# Patient Record
Sex: MALE | Race: BLACK OR AFRICAN AMERICAN
[De-identification: names, ages, dates, MRNs, and addresses within clinical notes are randomized per-mention and may not be internally consistent; named-entity substitution may affect disease eponyms.]

---

## 2022-05-12 ENCOUNTER — HOSPITAL ENCOUNTER (EMERGENCY)
Dept: HOSPITAL 61 - ER | Age: 62
Discharge: HOME | End: 2022-05-12
Payer: MEDICAID

## 2022-05-12 VITALS — SYSTOLIC BLOOD PRESSURE: 113 MMHG | DIASTOLIC BLOOD PRESSURE: 75 MMHG

## 2022-05-12 VITALS — WEIGHT: 187.39 LBS | BODY MASS INDEX: 26.23 KG/M2 | HEIGHT: 71 IN

## 2022-05-12 DIAGNOSIS — M54.6: ICD-10-CM

## 2022-05-12 DIAGNOSIS — R51.9: ICD-10-CM

## 2022-05-12 DIAGNOSIS — M54.2: ICD-10-CM

## 2022-05-12 DIAGNOSIS — M54.42: Primary | ICD-10-CM

## 2022-05-12 LAB
ALBUMIN SERPL-MCNC: 4 G/DL (ref 3.4–5)
ALBUMIN/GLOB SERPL: 0.8 {RATIO} (ref 1–1.7)
ALP SERPL-CCNC: 78 U/L (ref 46–116)
ALT SERPL-CCNC: 33 U/L (ref 16–63)
AMPHETAMINE/METHAMPHETAMINE: (no result)
ANION GAP SERPL CALC-SCNC: 13 MMOL/L (ref 6–14)
ANISOCYTOSIS BLD QL SMEAR: SLIGHT
APTT PPP: YELLOW S
AST SERPL-CCNC: 44 U/L (ref 15–37)
BACTERIA #/AREA URNS HPF: (no result) /HPF
BARBITURATES UR-MCNC: (no result) UG/ML
BASOPHILS # BLD AUTO: 0 X10^3/UL (ref 0–0.2)
BASOPHILS NFR BLD: 1 % (ref 0–3)
BENZODIAZ UR-MCNC: (no result) UG/L
BILIRUB SERPL-MCNC: 0.5 MG/DL (ref 0.2–1)
BILIRUB UR QL STRIP: NEGATIVE
BUN SERPL-MCNC: 15 MG/DL (ref 8–26)
BUN/CREAT SERPL: 12 (ref 6–20)
CALCIUM SERPL-MCNC: 9.2 MG/DL (ref 8.5–10.1)
CANNABINOIDS UR-MCNC: (no result) UG/L
CHLORIDE SERPL-SCNC: 104 MMOL/L (ref 98–107)
CO2 SERPL-SCNC: 23 MMOL/L (ref 21–32)
COCAINE UR-MCNC: (no result) NG/ML
CREAT SERPL-MCNC: 1.3 MG/DL (ref 0.7–1.3)
EOSINOPHIL NFR BLD: 0.1 X10^3/UL (ref 0–0.7)
EOSINOPHIL NFR BLD: 2 % (ref 0–3)
ERYTHROCYTE [DISTWIDTH] IN BLOOD BY AUTOMATED COUNT: 23.5 % (ref 11.5–14.5)
FIBRINOGEN PPP-MCNC: CLEAR MG/DL
GFR SERPLBLD BASED ON 1.73 SQ M-ARVRAT: 67.7 ML/MIN
GLUCOSE SERPL-MCNC: 120 MG/DL (ref 70–99)
HCT VFR BLD CALC: 27.1 % (ref 39–53)
HGB BLD-MCNC: 8.2 G/DL (ref 13–17.5)
LYMPHOCYTES # BLD: 2.2 X10^3/UL (ref 1–4.8)
LYMPHOCYTES NFR BLD AUTO: 30 % (ref 24–48)
MCH RBC QN AUTO: 24 PG (ref 25–35)
MCHC RBC AUTO-ENTMCNC: 30 G/DL (ref 31–37)
MCV RBC AUTO: 79 FL (ref 79–100)
METHADONE SERPL-MCNC: (no result) NG/ML
MONO #: 0.5 X10^3/UL (ref 0–1.1)
MONOCYTES NFR BLD: 7 % (ref 0–9)
NEUT #: 4.3 X10^3/UL (ref 1.8–7.7)
NEUTROPHILS NFR BLD AUTO: 60 % (ref 31–73)
NITRITE UR QL STRIP: NEGATIVE
OPIATES UR-MCNC: (no result) NG/ML
PCP SERPL-MCNC: (no result) MG/DL
PH UR STRIP: 6 [PH]
PLATELET # BLD AUTO: 98 X10^3/UL (ref 140–400)
PLATELET # BLD EST: (no result) 10*3/UL
POTASSIUM SERPL-SCNC: 3.8 MMOL/L (ref 3.5–5.1)
PROT SERPL-MCNC: 8.8 G/DL (ref 6.4–8.2)
PROT UR STRIP-MCNC: 100 MG/DL
RBC # BLD AUTO: 3.42 X10^6/UL (ref 4.3–5.7)
RBC #/AREA URNS HPF: 0 /HPF (ref 0–2)
SODIUM SERPL-SCNC: 140 MMOL/L (ref 136–145)
UROBILINOGEN UR-MCNC: 0.2 MG/DL
WBC # BLD AUTO: 7.2 X10^3/UL (ref 4–11)
WBC #/AREA URNS HPF: (no result) /HPF (ref 0–4)

## 2022-05-12 PROCEDURE — 99285 EMERGENCY DEPT VISIT HI MDM: CPT

## 2022-05-12 PROCEDURE — 85025 COMPLETE CBC W/AUTO DIFF WBC: CPT

## 2022-05-12 PROCEDURE — 84484 ASSAY OF TROPONIN QUANT: CPT

## 2022-05-12 PROCEDURE — 36415 COLL VENOUS BLD VENIPUNCTURE: CPT

## 2022-05-12 PROCEDURE — 80053 COMPREHEN METABOLIC PANEL: CPT

## 2022-05-12 PROCEDURE — 70450 CT HEAD/BRAIN W/O DYE: CPT

## 2022-05-12 PROCEDURE — 72131 CT LUMBAR SPINE W/O DYE: CPT

## 2022-05-12 PROCEDURE — 72125 CT NECK SPINE W/O DYE: CPT

## 2022-05-12 PROCEDURE — 81001 URINALYSIS AUTO W/SCOPE: CPT

## 2022-05-12 PROCEDURE — 80307 DRUG TEST PRSMV CHEM ANLYZR: CPT

## 2022-05-12 PROCEDURE — 93005 ELECTROCARDIOGRAM TRACING: CPT

## 2022-05-12 PROCEDURE — 72128 CT CHEST SPINE W/O DYE: CPT

## 2022-05-12 PROCEDURE — 87086 URINE CULTURE/COLONY COUNT: CPT

## 2022-05-12 NOTE — EKG
[address] [place] [address]

Test Date:    2022               Test Time:    10:50:15

Pat Name:     FREDA THOMSON          Department:   

Patient ID:   [id number]           Room:          

Gender:       M                        Technician:   

:          1960               Requested By: ARIE JOHNSON

Order Number: [id number]           Reading MD:   Nba Alvarenga

                                 Measurements

Intervals                              Axis          

Rate:         67                       P:            40

IL:           136                      QRS:          34

QRSD:         80                       T:            42

QT:           382                                    

QTc:          406                                    

                           Interpretive Statements

SINUS RHYTHM

Electronically Signed On 2022 14:47:03 CDT by Nba Alvarenga

## 2022-05-12 NOTE — RAD
PQRS Compliance Statement:



One or more of the following individualized dose reduction techniques were utilized for this examinat
ion:  

1. Automated exposure control  

2. Adjustment of the mA and/or kV according to patient size  

3. Use of iterative reconstruction technique





CT head and cervical spine without contrast 5/12/2022 9:51 AM



INDICATION: Fall, pain and syncope



COMPARISON: None available



TECHNIQUE: Multiple axial CT images of the head were obtained from skull base through the vertex with
out intravenous contrast. Multiple axial CT images of the cervical, thoracic and lumbar spine were ob
tained without intravenous contrast. Coronal and sagittal reformats are provided.



FINDINGS:



Head:



Ventricles, sulci and basal cisterns are within normal limits. There is no hydrocephalus. Gray-white 
matter differentiation is normal. There is no acute intracranial hemorrhage. There is no mass, mass e
ffect or midline shift. Posterior fossa is normal in appearance.



Visualized portions of the orbits are normal. Paranasal sinuses are well aerated. Mastoid air cells a
re well aerated. Scalp and calvaria are normal.



Cervical spine:



Alignment of the cervical spine is normal. Skull base is intact. Craniocervical junction is normal in
 appearance. Atlantoaxial articulation is normal.



Vertebral body heights are maintained without evidence for acute fracture.



Mild disc height loss at and C6-C7 and C7-T1. Moderate anterior marginal osteophytosis identified at 
C5-C6 and C6-C7 without acute fracture.



At C2-C3, there is a posterior disc osteophyte complex asymmetric to the right. Moderate to severe ri
ght and moderate left facet arthropathy. Moderate right and mild left neuroforaminal stenosis. Mild s
ravin canal stenosis.



At C3-C4, there is a disc bulge asymmetric to the left. Moderate severe facet arthropathy. Mild uncov
ertebral joint disease. Moderate bilateral neuroforaminal stenosis. Mild spinal canal stenosis.



At C4-C5, there is a posterior disc osteophyte complex. Severe right and moderate left facet arthropa
thy. Moderate uncovertebral joint disease. Moderate to severe right and mild left neuroforaminal sten
osis. No spinal canal stenosis.



At C6 5 C6, there is a posterior disc osteophyte complex. Moderate uncovertebral joint disease. Moder
ate severe bilateral facet arthropathy, right greater than left. Moderate neuroforaminal stenosis, le
ft greater than right. Mild spinal canal stenosis.



At C6-C7, there is a posterior disc osteophyte complex. Mild to moderate facet arthropathy. Moderate 
to advanced right and moderate left uncovertebral joint disease. Moderate severe right and moderate l
eft neuroforaminal stenosis. Mild spinal canal stenosis.



There is no prevertebral soft tissue swelling. Thyroid gland is normal in appearance. Visualized port
ions of the lung apices are normal without evidence for suspicious pulmonary nodule or infiltrate.



Thoracic spine:



Alinement of thoracic spine is normal. Vertebral body heights are maintained. Acute fractures identif
ied. Disc heights are maintained. Moderate anterior marginal osteophytosis. Bridging anterior margina
l osteophytosis identified with clefts noted at T6-T7 and T11-T12. No acute fracture. No osseous neur
oforaminal or spinal canal stenosis. Lungs are clear. Mediastinum is normal in appearance. Thoracic a
eduard is normal in caliber. Adrenal glands are normal.



Lumbar spine:



Alignment of the lumbar spine is normal. Vertebral body heights are maintained. Acute fractures ident
ified. Severe anterior marginal osteophytosis identified at L2-L3 and moderate anterior marginal oste
ophytosis at L3-L4 and L4-L5. Disc heights are maintained. There is congenital narrowing of the spina
l canal secondary to shortened pedicles. Abdominal aorta is normal in caliber with moderate calcified
 atheromatous plaque. No suspicious intracranial abnormality is identified. Visualized portions of th
e sacrum appear intact. Sacroiliac joints are well aligned. Transverse processes are intact.



At L2-L3, there is a circumferential disc bulge with moderate facet arthropathy and ligamentum flavum
 infolding resulting in moderate bilateral neuroforaminal stenosis and moderate severe spinal canal s
tenosis.



At L3-L4: There is a circumferential disc bulge with moderate facet arthropathy ligamentum flavum inf
olding. Findings result in moderate bilateral neuroforaminal stenosis and moderate spinal canal steno
sis.



At L4-L5, there is a disc bulge asymmetric to the left with left far lateral disc protrusion. Severe 
left and moderate facet arthropathy ligamentum flavum infolding. Severe left and moderate right neuro
foraminal stenosis. Moderate spinal canal stenosis.



L5-S1: There is a disc bulge asymmetric to the right. Severe right and moderate left facet arthropath
y. Moderate severe bilateral neuroforaminal stenosis. Mild spinal canal stenosis.



IMPRESSION:



1. No acute intracranial hemorrhage.

2. No acute fracture or malalignment of the cervical spine. Mild to moderate cervical spondylosis.

3. No acute fracture or malalignment of the thoracic spine.

4. Mild to moderate lumbar spondylosis with congenital narrowing of the spinal canal secondary to jinny
rtened pedicles. No acute fracture of the lumbar spine.



Electronically signed by: Rafaela Roa MD (5/12/2022 10:35 AM) [id number]

## 2022-05-12 NOTE — PHYS DOC
Past Medical History


Additional Past Medical Histor:  NEUROPATHY


Past Surgical History:  No Surgical History





General Adult


EDM:


Chief Complaint:  BACK PAIN OR INJURY





HPI:


HPI:





Patient is a 62  year old male who presents with here for upper back pain with 

muscle tightness, mid back pain and lower back pain with sharp shooting pains 

that goes down the left leg.  Patient states that he passes out a lot and does 

not know why.  He states is been going on for the last couple of months.  He 

states he has been seen for this before.  He cannot tell me anything about his 

past medical history or what medications he is on.  He states he has home 

health.  Daughter is in the room but also a very poor historian.  She stated 2 

days ago he was walking into her kitchen and fell backward. Daughter stated he 

did not blackout.  Patient and daughter are unable to tell me if he struck his 

head or if he has a seizure condition.  Patient states he has been out of his 

gabapentin and trazodone for the last 2 weeks.  Patient states he goes to [facility] but the ketty he is to see is not there any longer.  He states that he gets

his medications from [facility].  Patient rates his pain at a 10 out of 10 at 

this time.





Review of Systems:


Review of Systems:


Constitutional:   Denies fever or chills. []


Eyes:   Denies change in visual acuity. []


HENT:   Denies nasal congestion or sore throat. [] 


Respiratory:   Denies cough or shortness of breath. [] 


Cardiovascular:   Denies chest pain or edema. [] 


GI:   Denies abdominal pain, nausea, vomiting, bloody stools or diarrhea. [] 


:  Denies dysuria. [] 


Musculoskeletal:   + Cervical, thoracic, +lumbar back pain or denies joint pain.

  +Sharp shooting left leg pain [] 


Integument:   Denies rash. [] 


Neurologic:   Denies headache, focal weakness or sensory changes. + Syncopal 

episodes [] 


Endocrine:   Denies polyuria or polydipsia. [] 


Lymphatic:  Denies swollen glands. [] 


Psychiatric:  Denies depression or anxiety. []





Heart Score:


C/O Chest Pain:  No


Risk Factors:


Risk Factors:  DM, Current or recent (<one month) smoker, HTN, HLP, family 

history of CAD, obesity.


Risk Scores:


Score 0 - 3:  2.5% MACE over next 6 weeks - Discharge Home


Score 4 - 6:  20.3% MACE over next 6 weeks - Admit for Clinical Observation


Score 7 - 10:  72.7% MACE over next 6 weeks - Early Invasive Strategies





Current Medications:





Current Medications








 Medications


  (Trade)  Dose


 Ordered  Sig/Leonardo  Start Time


 Stop Time Status Last Admin


Dose Admin


 


 Dexamethasone


  (Decadron)  10 mg  1X  ONCE  22 09:30


 5 09:31 DC  





 


 Lidocaine


  (Lidoderm)  1 patch  1X  ONCE  22 09:30


 22 09:31 DC  














Allergies:


Allergies:





Allergies








Coded Allergies Type Severity Reaction Last Updated Verified


 


  No Known Drug Allergies    22 No











Physical Exam:


PE:





Constitutional: Well developed, well nourished, no acute distress, non-toxic 

appearance. []


HENT: Normocephalic, atraumatic, bilateral external ears normal, oropharynx 

moist, no oral exudates, nose normal. []


Eyes: PERRLA, EOMI, conjunctiva normal, no discharge. [] 


Neck: Normal range of motion, no tenderness, supple, no stridor. [] 


Cardiovascular:Heart rate regular rhythm, no murmur []


Lungs & Thorax:  Bilateral breath sounds clear to auscultation []


Abdomen: Bowel sounds normal, soft, no tenderness, no masses, no pulsatile 

masses. [] 


Skin: Warm, dry, no erythema, no rash. [] 


Back: Thoracic and lumbar tenderness, no CVA tenderness. [] 


Extremities: No tenderness, no cyanosis, no clubbing, ROM intact, no edema. [] 


Neurologic: Alert and oriented X 3, normal motor function, normal sensory func

tion, no focal deficits noted.  Tremors []


Psychologic: Affect normal, judgement normal, mood normal. []





Current Patient Data:


Vital Signs:





                                   Vital Signs








  Date Time  Temp Pulse Resp B/P (MAP) Pulse Ox O2 Delivery O2 Flow Rate FiO2


 


22 09:07 97.0 89 20 176/100 (125) 100   





 97.0       











EKG:


EK and read by Dr. Brewster is a sinus rhythm without STEMI.





Radiology/Procedures:


Radiology/Procedures:


[]


Impression:


                            [facility]


                    [address]  [place], [place] [id number] [phone]


                                        


                                 IMAGING REPORT





                                     Signed





PATIENT: FREDA THOMSON  ACCOUNT: [id number]     MRN#: [id number]


: 1960           LOCATION: ER              AGE: 62


SEX: M                    EXAM DT: 22         ACCESSION#: [id number]


STATUS: REG ER            ORD. PHYSICIAN: ARIE JOHNSON


REASON: FALL, PAIN, SYNCOPE


PROCEDURE: CT THORACIC SPINE WO CONTRAST





PQRS Compliance Statement:





One or more of the following individualized dose reduction techniques were 

utilized for this examination:  


1. Automated exposure control  


2. Adjustment of the mA and/or kV according to patient size  


3. Use of iterative reconstruction technique








CT head and cervical spine without contrast 2022 9:51 AM





INDICATION: Fall, pain and syncope





COMPARISON: None available





TECHNIQUE: Multiple axial CT images of the head were obtained from skull base 

through the vertex without intravenous contrast. Multiple axial CT images of the

 cervical, thoracic and lumbar spine were obtained without intravenous contrast.

 Coronal and sagittal reformats are provided.





FINDINGS:





Head:





Ventricles, sulci and basal cisterns are within normal limits. There is no 

hydrocephalus. Gray-white matter differentiation is normal. There is no acute 

intracranial hemorrhage. There is no mass, mass effect or midline shift. 

Posterior fossa is normal in appearance.





Visualized portions of the orbits are normal. Paranasal sinuses are well 

aerated. Mastoid air cells are well aerated. Scalp and calvaria are normal.





Cervical spine:





Alignment of the cervical spine is normal. Skull base is intact. Craniocervical 

junction is normal in appearance. Atlantoaxial articulation is normal.





Vertebral body heights are maintained without evidence for acute fracture.





Mild disc height loss at and C6-C7 and C7-T1. Moderate anterior marginal 

osteophytosis identified at C5-C6 and C6-C7 without acute fracture.





At C2-C3, there is a posterior disc osteophyte complex asymmetric to the right. 

Moderate to severe right and moderate left facet arthropathy. Moderate right and

 mild left neuroforaminal stenosis. Mild spinal canal stenosis.





At C3-C4, there is a disc bulge asymmetric to the left. Moderate severe facet 

arthropathy. Mild uncovertebral joint disease. Moderate bilateral neuroforaminal

 stenosis. Mild spinal canal stenosis.





At C4-C5, there is a posterior disc osteophyte complex. Severe right and 

moderate left facet arthropathy. Moderate uncovertebral joint disease. Moderate 

to severe right and mild left neuroforaminal stenosis. No spinal canal stenosis.





At C6 5 C6, there is a posterior disc osteophyte complex. Moderate uncovertebral

 joint disease. Moderate severe bilateral facet arthropathy, right greater than 

left. Moderate neuroforaminal stenosis, left greater than right. Mild spinal 

canal stenosis.





At C6-C7, there is a posterior disc osteophyte complex. Mild to moderate facet 

arthropathy. Moderate to advanced right and moderate left uncovertebral joint 

disease. Moderate severe right and moderate left neuroforaminal stenosis. Mild 

spinal canal stenosis.





There is no prevertebral soft tissue swelling. Thyroid gland is normal in 

appearance. Visualized portions of the lung apices are normal without evidence 

for suspicious pulmonary nodule or infiltrate.





Thoracic spine:





Alinement of thoracic spine is normal. Vertebral body heights are maintained. 

Acute fractures identified. Disc heights are maintained. Moderate anterior 

marginal osteophytosis. Bridging anterior marginal osteophytosis identified with

 clefts noted at T6-T7 and T11-T12. No acute fracture. No osseous neuroforaminal

 or spinal canal stenosis. Lungs are clear. Mediastinum is normal in appearance.

 Thoracic aorta is normal in caliber. Adrenal glands are normal.





Lumbar spine:





Alignment of the lumbar spine is normal. Vertebral body heights are maintained. 

Acute fractures identified. Severe anterior marginal osteophytosis identified at

 L2-L3 and moderate anterior marginal osteophytosis at L3-L4 and L4-L5. Disc 

heights are maintained. There is congenital narrowing of the spinal canal 

secondary to shortened pedicles. Abdominal aorta is normal in caliber with mode

rate calcified atheromatous plaque. No suspicious intracranial abnormality is 

identified. Visualized portions of the sacrum appear intact. Sacroiliac joints 

are well aligned. Transverse processes are intact.





At L2-L3, there is a circumferential disc bulge with moderate facet arthropathy 

and ligamentum flavum infolding resulting in moderate bilateral neuroforaminal 

stenosis and moderate severe spinal canal stenosis.





At L3-L4: There is a circumferential disc bulge with moderate facet arthropathy 

ligamentum flavum infolding. Findings result in moderate bilateral 

neuroforaminal stenosis and moderate spinal canal stenosis.





At L4-L5, there is a disc bulge asymmetric to the left with left far lateral 

disc protrusion. Severe left and moderate facet arthropathy ligamentum flavum 

infolding. Severe left and moderate right neuroforaminal stenosis. Moderate 

spinal canal stenosis.





L5-S1: There is a disc bulge asymmetric to the right. Severe right and moderate 

left facet arthropathy. Moderate severe bilateral neuroforaminal stenosis. Mild 

spinal canal stenosis.





IMPRESSION:





1. No acute intracranial hemorrhage.


2. No acute fracture or malalignment of the cervical spine. Mild to moderate 

cervical spondylosis.


3. No acute fracture or malalignment of the thoracic spine.


4. Mild to moderate lumbar spondylosis with congenital narrowing of the spinal 

canal secondary to shortened pedicles. No acute fracture of the lumbar spine.





Electronically signed by: Dragan Castañeda MD (2022 10:35 AM) [id number]














DICTATED and SIGNED BY:     DRAGAN CASTAÑEDA MD


DATE:     22 1022





Course & Med Decision Making:


Course & Med Decision Making


Pertinent Labs and Imaging studies reviewed. (See chart for details)





See HPI.  I had the nurse RN Oscar called [facility] pharmacy and he spoke to

 the pharmacy of which they state that patient is on amlodipine of which she 

picked up in January, Zanaflex of which she picked up last on  and 

ibuprofen 600 mg.  They state that they have no prescriptions ever given for 

gabapentin or trazodone for the patient.  He has tremors just sitting still and 

with movement.  States he has had those chronically.  He states he does have 

neuropathy but cannot tell me why.  Full range of motion of his legs and all of 

his extremities with equal strength.  Denies any dizziness, chest pain, 

shortness of breath, new focal weakness or numbness and tingling, recent 

illness, vision change, headache, abdominal pain, nausea, vomiting, diarrhea.  

Vital signs are within normal limits although he is hypertensive.  Speaks in 

full clear sentences.  Skin pink warm and dry.  Alert and oriented x4.  No 

extremity edema.  Lungs are clear are clear to auscultation in all lobes.





Patient is positive for PCP.  CT shows no acute findings.  I gave patient a dose

 of his Norvasc 5 mg because he is hypertensive and he states he has not taken 

any for 2 weeks.  Patient will not be getting any gabapentin or narcotics here 

today due to his PCP drug use.  He needs to follow-up with his primary care 

provider.  He is given a lidocaine patch and a Zanaflex in the ED. Troponin is 

negative.  EKG shows no acute findings and is a sinus rhythm without STEMI. I 

went over history, results, and care plan with Dr Brewster and he states patient is 

ok to go home.





Dragon Disclaimer:


Dragon Disclaimer:


This electronic medical record was generated, in whole or in part, using a voice

 recognition dictation system.





Departure


Departure


Impression:  


   Primary Impression:  


   Back pain


   Qualified Codes:  M54.42 - Lumbago with sciatica, left side


   Additional Impressions:  


   Sciatica


   Qualified Codes:  M54.32 - Sciatica, left side


   Frequent falls


   PCP abuse


Disposition:   HOME / SELF CARE / HOMELESS


Condition:  STABLE


Referrals:  


UNKNOWN PCP NAME (PCP)


Patient Instructions:  Alcohol and Drug Addiction, Finding Treatment, Back Pain,

 Adult, Fall Prevention and Home Safety, Sciatica with [facility]





Additional Instructions:  


Stop using drugs.  Follow-up with your primary care provider soon as possible.  

Take medication as prescribed and with food.  Drink plenty of fluids to stay 

hydrated.  If you have another syncopal episode you should return to the 

emergency room.  With the diclofenac that I am given you do not take other 

NSAIDs with this.


Scripts


Diclofenac Sodium (DICLOFENAC SODIUM) 50 Mg Tablet.dr


1 TAB PO BID for 10 Days, #20 TAB 1 Refill


   Prov: ARIE JOHNSON         22 


Tizanidine Hcl (ZANAFLEX) 4 Mg Tablet


1 TAB PO TID for 10 Days, #30 TAB 0 Refills


   Prov: ARIE JOHNSON         22 


Amlodipine Besylate (AMLODIPINE BESYLATE) 5 Mg Tablet


5 MG PO DAILY, #30 TAB


   Prov: ARIE JOHNSON         22











ARIE JOHNSON            May 12, 2022 09:47

## 2022-05-15 ENCOUNTER — HOSPITAL ENCOUNTER (EMERGENCY)
Dept: HOSPITAL 61 - ER | Age: 62
Discharge: HOME | End: 2022-05-15
Payer: MEDICAID

## 2022-05-15 VITALS — WEIGHT: 194.01 LBS | HEIGHT: 71 IN | BODY MASS INDEX: 27.16 KG/M2

## 2022-05-15 VITALS — SYSTOLIC BLOOD PRESSURE: 113 MMHG | DIASTOLIC BLOOD PRESSURE: 67 MMHG

## 2022-05-15 DIAGNOSIS — I10: ICD-10-CM

## 2022-05-15 DIAGNOSIS — M54.42: Primary | ICD-10-CM

## 2022-05-15 PROCEDURE — 96372 THER/PROPH/DIAG INJ SC/IM: CPT

## 2022-05-15 PROCEDURE — 99284 EMERGENCY DEPT VISIT MOD MDM: CPT

## 2022-05-15 NOTE — PHYS DOC
Past Medical History


Past Medical History:  Hypertension, Sciatica


Additional Past Medical Histor:  NEUROPATHY


Past Surgical History:  No Surgical History





General Adult


EDM:


Chief Complaint:  BACK PAIN OR INJURY





HPI:


HPI:





Patient is a 62  year old male patient with history of sciatica, hypertension, 

presenting to the ED today complaining of 10 out of 10 left low back pain 

radiating to the left lower extremity, symptoms of been going on for couple 

days.  Patient denies any trauma denies any loss of bowel/bladder function.  

Describes the pain as sharp and constant worse on weightbearing.  Reports he was

seen in the ED 3 days ago but the medicine he was given is not touching the 

pain.





Review of Systems:


Review of Systems:


Constitutional:   Denies fever or chills. []


GI:   Denies abdominal pain, nausea, vomiting, bloody stools or diarrhea. [] 


:  Denies dysuria. [] 


Musculoskeletal:   Reports left low back pain radiating to the left lower 

extremity


Integument:   Denies rash. [] 


Neurologic:   Denies headache, focal weakness or sensory changes. [] 


Psychiatric:  Denies depression or anxiety. []





Heart Score:


C/O Chest Pain:  N/A


Risk Factors:


Risk Factors:  DM, Current or recent (<one month) smoker, HTN, HLP, family 

history of CAD, obesity.


Risk Scores:


Score 0 - 3:  2.5% MACE over next 6 weeks - Discharge Home


Score 4 - 6:  20.3% MACE over next 6 weeks - Admit for Clinical Observation


Score 7 - 10:  72.7% MACE over next 6 weeks - Early Invasive Strategies





Current Medications:





Current Medications








 Medications


  (Trade)  Dose


 Ordered  Sig/[facility]  Start Time


 Stop Time Status Last Admin


Dose Admin


 


 Dexamethasone


 Sodium Phosphate


  (Decadron)  10 mg  1X  ONCE  5/15/22 17:45


 5/15/22 17:46 UNV  





 


 Diazepam


  (Valium)  5 mg  1X  ONCE  5/15/22 17:45


 5/15/22 17:46 UNV  





 


 Morphine Sulfate


  (Morphine


 Sulfate)  4 mg  1X  ONCE  5/15/22 17:45


 5/15/22 17:46 UNV  














Allergies:


Allergies:





Allergies








Coded Allergies Type Severity Reaction Last Updated Verified


 


  No Known Drug Allergies    5/12/22 No











Physical Exam:


PE:





Constitutional: Well developed, well nourished, no acute distress, non-toxic 

appearance. [][] 


Skin: Warm, dry, no erythema, no rash. [] 


Back: Moderate left SI joint tenderness to the left, no CVA tenderness.  

Positive straight leg raise at approximately 40 degrees


Extremities: No tenderness, no cyanosis, no clubbing, ROM intact, no edema. [] 


Neurologic: Alert and oriented X 3, normal motor function, normal sensory 

function, no focal deficits noted. []


Psychologic: Affect normal, judgement normal, mood normal. []





Current Patient Data:


Vital Signs:





                                   Vital Signs








  Date Time  Temp Pulse Resp B/P (MAP) Pulse Ox O2 Delivery O2 Flow Rate FiO2


 


5/15/22 17:32 97.7 63 20 113/67 (82) 98 Room Air  





 97.7       











EKG:


EKG:


[]





Radiology/Procedures:


Radiology/Procedures:


[]





Course & Med Decision Making:


Course & Med Decision Making


Pertinent Labs and Imaging studies reviewed. (See chart for details)





This is a 62-year-old male patient presented to the ED today with exacerbation 

of sciatica pain.  Patient has no injury, no cauda equina syndrome symptoms.  

Was given pain medicine in the ED.  Discharge to home with pain medicine.  

Follow-up with PCP in the course of this week





Dragon Disclaimer:


Dragon Disclaimer:


This electronic medical record was generated, in whole or in part, using a voice

 recognition dictation system.





Departure


Departure


Impression:  


   Primary Impression:  


   Sciatica


   Qualified Codes:  M54.32 - Sciatica, left side


Disposition:  01 HOME / SELF CARE / HOMELESS


Condition:  STABLE


Referrals:  


UNKNOWN PCP NAME (PCP)


follow up with your doctor in the course of this week


Patient Instructions:  Sciatica with [facility]





Additional Instructions:  


You were evaluated in the emergency room for low back pain with sciatica.  

Please take the prescribed medications as ordered.  Follow-up with your own 

doctor in the course of this week


Scripts


Prednisone (PREDNISONE) 50 Mg Tablet


1 TAB PO DAILY, #5 TAB


   Prov: LAURY MOMIN APRN         5/15/22 


Hydrocodone Bit/Acetaminophen (HYDROCODONE-APAP 5-325  **) 1 Tab Tablet


1 TAB PO PRN Q6HRS PRN for PAIN, #10 TAB 0 Refills


   Prov: LILIANELAURY PATTI APRN         5/15/22











LAURY MOMIN AMARJIT            May 15, 2022 17:50